# Patient Record
Sex: FEMALE | Race: WHITE | NOT HISPANIC OR LATINO
[De-identification: names, ages, dates, MRNs, and addresses within clinical notes are randomized per-mention and may not be internally consistent; named-entity substitution may affect disease eponyms.]

---

## 2020-01-29 ENCOUNTER — APPOINTMENT (OUTPATIENT)
Dept: INTERNAL MEDICINE | Facility: CLINIC | Age: 55
End: 2020-01-29
Payer: COMMERCIAL

## 2020-01-29 VITALS
RESPIRATION RATE: 16 BRPM | BODY MASS INDEX: 25.86 KG/M2 | WEIGHT: 137 LBS | DIASTOLIC BLOOD PRESSURE: 84 MMHG | HEART RATE: 83 BPM | HEIGHT: 61 IN | OXYGEN SATURATION: 96 % | SYSTOLIC BLOOD PRESSURE: 124 MMHG

## 2020-01-29 DIAGNOSIS — Z82.49 FAMILY HISTORY OF ISCHEMIC HEART DISEASE AND OTHER DISEASES OF THE CIRCULATORY SYSTEM: ICD-10-CM

## 2020-01-29 DIAGNOSIS — J11.1 INFLUENZA DUE TO UNIDENTIFIED INFLUENZA VIRUS WITH OTHER RESPIRATORY MANIFESTATIONS: ICD-10-CM

## 2020-01-29 DIAGNOSIS — Z83.438 FAMILY HISTORY OF OTHER DISORDER OF LIPOPROTEIN METABOLISM AND OTHER LIPIDEMIA: ICD-10-CM

## 2020-01-29 PROCEDURE — 99203 OFFICE O/P NEW LOW 30 MIN: CPT

## 2020-01-29 NOTE — PHYSICAL EXAM
[No Acute Distress] : no acute distress [Well Nourished] : well nourished [Normal Oropharynx] : the oropharynx was normal [Normal Outer Ear/Nose] : the outer ears and nose were normal in appearance [No JVD] : no jugular venous distention [No Lymphadenopathy] : no lymphadenopathy [No Respiratory Distress] : no respiratory distress  [No Accessory Muscle Use] : no accessory muscle use [Clear to Auscultation] : lungs were clear to auscultation bilaterally [Normal Rate] : normal rate  [Regular Rhythm] : with a regular rhythm [Normal S1, S2] : normal S1 and S2 [No Edema] : there was no peripheral edema [No Extremity Clubbing/Cyanosis] : no extremity clubbing/cyanosis [Soft] : abdomen soft [Non Tender] : non-tender [Non-distended] : non-distended

## 2020-01-29 NOTE — HISTORY OF PRESENT ILLNESS
[de-identified] : Pt comes to establish care  Pt is s/p flu in Jan  Had fliu shot in [facility]  Pt is exposed as a

## 2020-01-29 NOTE — REVIEW OF SYSTEMS
[Fever] : no fever [Chills] : no chills [Chest Pain] : no chest pain [Palpitations] : no palpitations [Lower Ext Edema] : no lower extremity edema [Wheezing] : no wheezing [Shortness Of Breath] : no shortness of breath [Cough] : no cough [Abdominal Pain] : no abdominal pain [Diarrhea] : diarrhea [Nausea] : no nausea [Dysuria] : no dysuria

## 2020-02-01 ENCOUNTER — NON-APPOINTMENT (OUTPATIENT)
Age: 55
End: 2020-02-01

## 2020-02-01 ENCOUNTER — APPOINTMENT (OUTPATIENT)
Dept: INTERNAL MEDICINE | Facility: CLINIC | Age: 55
End: 2020-02-01
Payer: COMMERCIAL

## 2020-02-01 VITALS
SYSTOLIC BLOOD PRESSURE: 116 MMHG | DIASTOLIC BLOOD PRESSURE: 80 MMHG | OXYGEN SATURATION: 97 % | HEART RATE: 97 BPM | BODY MASS INDEX: 25.86 KG/M2 | WEIGHT: 137 LBS | HEIGHT: 61 IN

## 2020-02-01 PROCEDURE — 99396 PREV VISIT EST AGE 40-64: CPT | Mod: 25

## 2020-02-01 PROCEDURE — 81003 URINALYSIS AUTO W/O SCOPE: CPT | Mod: QW

## 2020-02-01 PROCEDURE — 90471 IMMUNIZATION ADMIN: CPT

## 2020-02-01 PROCEDURE — 90715 TDAP VACCINE 7 YRS/> IM: CPT

## 2020-02-01 PROCEDURE — 36415 COLL VENOUS BLD VENIPUNCTURE: CPT

## 2020-02-01 PROCEDURE — 93000 ELECTROCARDIOGRAM COMPLETE: CPT

## 2020-02-01 NOTE — PHYSICAL EXAM
[Well Nourished] : well nourished [No Acute Distress] : no acute distress [PERRL] : pupils equal round and reactive to light [Normal Sclera/Conjunctiva] : normal sclera/conjunctiva [Normal Outer Ear/Nose] : the outer ears and nose were normal in appearance [EOMI] : extraocular movements intact [Normal Oropharynx] : the oropharynx was normal [No JVD] : no jugular venous distention [No Lymphadenopathy] : no lymphadenopathy [No Respiratory Distress] : no respiratory distress  [Supple] : supple [Thyroid Normal, No Nodules] : the thyroid was normal and there were no nodules present [Clear to Auscultation] : lungs were clear to auscultation bilaterally [Normal Rate] : normal rate  [No Accessory Muscle Use] : no accessory muscle use [Regular Rhythm] : with a regular rhythm [Normal S1, S2] : normal S1 and S2 [No Edema] : there was no peripheral edema [No Extremity Clubbing/Cyanosis] : no extremity clubbing/cyanosis [No Carotid Bruits] : no carotid bruits [Declined Breast Exam] : declined breast exam  [Soft] : abdomen soft [Non-distended] : non-distended [Non Tender] : non-tender [No Masses] : no abdominal mass palpated [Normal Posterior Cervical Nodes] : no posterior cervical lymphadenopathy [No HSM] : no HSM [Declined Rectal Exam] : declined rectal exam [No CVA Tenderness] : no CVA  tenderness [Normal Anterior Cervical Nodes] : no anterior cervical lymphadenopathy [No Spinal Tenderness] : no spinal tenderness [No Joint Swelling] : no joint swelling [No Rash] : no rash [Coordination Grossly Intact] : coordination grossly intact [Normal Gait] : normal gait [No Focal Deficits] : no focal deficits [Normal Affect] : the affect was normal [Normal Insight/Judgement] : insight and judgment were intact

## 2020-02-01 NOTE — REVIEW OF SYSTEMS
[Chills] : no chills [Fever] : no fever [Discharge] : no discharge [Vision Problems] : no vision problems [Earache] : no earache [Hoarseness] : no hoarseness [Chest Pain] : no chest pain [Hearing Loss] : no hearing loss [Leg Claudication] : no leg claudication [Palpitations] : no palpitations [Shortness Of Breath] : no shortness of breath [Lower Ext Edema] : no lower extremity edema [Cough] : no cough [Wheezing] : no wheezing [Nausea] : no nausea [Abdominal Pain] : no abdominal pain [Hematuria] : no hematuria [Dysuria] : no dysuria [Incontinence] : no incontinence [Itching] : no itching [Mole Changes] : no mole changes [Hair Changes] : no hair changes [Skin Rash] : no skin rash [Headache] : no headache [Dizziness] : no dizziness [Confusion] : no confusion [Memory Loss] : no memory loss [Fainting] : no fainting [Depression] : no depression [Unsteady Walking] : no ataxia [Anxiety] : no anxiety [Easy Bleeding] : no easy bleeding [[email or web address]] : 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

## 2020-02-04 LAB
25(OH)D3 SERPL-MCNC: 41.2 NG/ML
ALBUMIN SERPL ELPH-MCNC: 4.6 G/DL
ALP BLD-CCNC: 79 U/L
ALT SERPL-CCNC: 33 U/L
ANION GAP SERPL CALC-SCNC: 10 MMOL/L
AST SERPL-CCNC: 21 U/L
BASOPHILS # BLD AUTO: 0.03 K/UL
BASOPHILS NFR BLD AUTO: 0.6 %
BILIRUB SERPL-MCNC: 0.8 MG/DL
BILIRUB UR QL STRIP: NEGATIVE
BUN SERPL-MCNC: 17 MG/DL
CALCIUM SERPL-MCNC: 9.4 MG/DL
CHLORIDE SERPL-SCNC: 110 MMOL/L
CHOLEST SERPL-MCNC: 115 MG/DL
CHOLEST/HDLC SERPL: 1.8 RATIO
CLARITY UR: CLEAR
CO2 SERPL-SCNC: 22 MMOL/L
COLLECTION METHOD: NORMAL
CREAT SERPL-MCNC: 0.67 MG/DL
EOSINOPHIL # BLD AUTO: 0.4 K/UL
EOSINOPHIL NFR BLD AUTO: 7.4 %
ESTIMATED AVERAGE GLUCOSE: 114 MG/DL
GLUCOSE SERPL-MCNC: 109 MG/DL
GLUCOSE UR-MCNC: NEGATIVE
HBA1C MFR BLD HPLC: 5.6 %
HCG UR QL: 0.2 EU/DL
HCT VFR BLD CALC: 46.6 %
HDLC SERPL-MCNC: 63 MG/DL
HGB BLD-MCNC: 15.2 G/DL
HGB UR QL STRIP.AUTO: NEGATIVE
IMM GRANULOCYTES NFR BLD AUTO: 0.2 %
KETONES UR-MCNC: NEGATIVE
LDLC SERPL CALC-MCNC: 41 MG/DL
LEUKOCYTE ESTERASE UR QL STRIP: NEGATIVE
LYMPHOCYTES # BLD AUTO: 1.63 K/UL
LYMPHOCYTES NFR BLD AUTO: 30.3 %
MAN DIFF?: NORMAL
MCHC RBC-ENTMCNC: 29.6 PG
MCHC RBC-ENTMCNC: 32.6 GM/DL
MCV RBC AUTO: 90.8 FL
MONOCYTES # BLD AUTO: 0.34 K/UL
MONOCYTES NFR BLD AUTO: 6.3 %
NEUTROPHILS # BLD AUTO: 2.97 K/UL
NEUTROPHILS NFR BLD AUTO: 55.2 %
NITRITE UR QL STRIP: NEGATIVE
PH UR STRIP: 6
PLATELET # BLD AUTO: 176 K/UL
POTASSIUM SERPL-SCNC: 4.3 MMOL/L
PROT SERPL-MCNC: 6.9 G/DL
PROT UR STRIP-MCNC: NEGATIVE
RBC # BLD: 5.13 M/UL
RBC # FLD: 13.1 %
SODIUM SERPL-SCNC: 142 MMOL/L
SP GR UR STRIP: 1.02
TRIGL SERPL-MCNC: 55 MG/DL
TSH SERPL-ACNC: 2 UIU/ML
WBC # FLD AUTO: 5.38 K/UL

## 2020-12-23 PROBLEM — J11.1 INFLUENZA IN ADULT: Status: RESOLVED | Noted: 2020-01-29 | Resolved: 2020-12-23

## 2022-05-02 ENCOUNTER — LABORATORY RESULT (OUTPATIENT)
Age: 57
End: 2022-05-02

## 2022-05-02 ENCOUNTER — APPOINTMENT (OUTPATIENT)
Dept: INTERNAL MEDICINE | Facility: CLINIC | Age: 57
End: 2022-05-02
Payer: COMMERCIAL

## 2022-05-02 VITALS
WEIGHT: 131 LBS | RESPIRATION RATE: 16 BRPM | HEART RATE: 71 BPM | OXYGEN SATURATION: 99 % | TEMPERATURE: 98 F | HEIGHT: 61 IN | BODY MASS INDEX: 24.73 KG/M2

## 2022-05-02 DIAGNOSIS — R21 RASH AND OTHER NONSPECIFIC SKIN ERUPTION: ICD-10-CM

## 2022-05-02 PROCEDURE — 99213 OFFICE O/P EST LOW 20 MIN: CPT | Mod: 25

## 2022-05-02 PROCEDURE — 36415 COLL VENOUS BLD VENIPUNCTURE: CPT

## 2022-05-02 NOTE — REVIEW OF SYSTEMS
[Fever] : no fever [Chills] : no chills [Chest Pain] : no chest pain [Shortness Of Breath] : no shortness of breath [Wheezing] : no wheezing [Abdominal Pain] : no abdominal pain [Itching] : Itching [Skin Rash] : skin rash

## 2022-05-02 NOTE — HISTORY OF PRESENT ILLNESS
[de-identified] : Pt comes for repeat blood test and refer to rheum  Pt had rash and derm did WENDY pos 1:160    No swelling of any joints  no FH

## 2022-05-02 NOTE — PHYSICAL EXAM
[No Acute Distress] : no acute distress [Normal Sclera/Conjunctiva] : normal sclera/conjunctiva [Normal Outer Ear/Nose] : the outer ears and nose were normal in appearance [No JVD] : no jugular venous distention [No Respiratory Distress] : no respiratory distress  [No Joint Swelling] : no joint swelling [Grossly Normal Strength/Tone] : grossly normal strength/tone [de-identified] : dry skin rt thumb base

## 2022-05-03 LAB
CRP SERPL-MCNC: <3 MG/L
ERYTHROCYTE [SEDIMENTATION RATE] IN BLOOD BY WESTERGREN METHOD: 23 MM/HR
RHEUMATOID FACT SER QL: <10 IU/ML

## 2022-05-06 ENCOUNTER — NON-APPOINTMENT (OUTPATIENT)
Age: 57
End: 2022-05-06

## 2022-05-06 LAB
ANA PAT FLD IF-IMP: ABNORMAL
ANACR T: ABNORMAL

## 2023-09-25 ENCOUNTER — NON-APPOINTMENT (OUTPATIENT)
Age: 58
End: 2023-09-25

## 2023-09-25 ENCOUNTER — LABORATORY RESULT (OUTPATIENT)
Age: 58
End: 2023-09-25

## 2023-09-25 ENCOUNTER — APPOINTMENT (OUTPATIENT)
Dept: INTERNAL MEDICINE | Facility: CLINIC | Age: 58
End: 2023-09-25
Payer: COMMERCIAL

## 2023-09-25 VITALS — BODY MASS INDEX: 24.73 KG/M2 | OXYGEN SATURATION: 97 % | HEIGHT: 61 IN | WEIGHT: 131 LBS | HEART RATE: 74 BPM

## 2023-09-25 VITALS — SYSTOLIC BLOOD PRESSURE: 120 MMHG | DIASTOLIC BLOOD PRESSURE: 80 MMHG

## 2023-09-25 DIAGNOSIS — Z23 ENCOUNTER FOR IMMUNIZATION: ICD-10-CM

## 2023-09-25 DIAGNOSIS — Z00.00 ENCOUNTER FOR GENERAL ADULT MEDICAL EXAMINATION W/OUT ABNORMAL FINDINGS: ICD-10-CM

## 2023-09-25 DIAGNOSIS — R76.8 OTHER SPECIFIED ABNORMAL IMMUNOLOGICAL FINDINGS IN SERUM: ICD-10-CM

## 2023-09-25 DIAGNOSIS — R73.9 HYPERGLYCEMIA, UNSPECIFIED: ICD-10-CM

## 2023-09-25 PROCEDURE — 93000 ELECTROCARDIOGRAM COMPLETE: CPT

## 2023-09-25 PROCEDURE — 90686 IIV4 VACC NO PRSV 0.5 ML IM: CPT

## 2023-09-25 PROCEDURE — 36415 COLL VENOUS BLD VENIPUNCTURE: CPT

## 2023-09-25 PROCEDURE — 99396 PREV VISIT EST AGE 40-64: CPT | Mod: 25

## 2023-09-25 PROCEDURE — 90471 IMMUNIZATION ADMIN: CPT

## 2023-09-26 LAB
ALBUMIN SERPL ELPH-MCNC: 4.7 G/DL
ALP BLD-CCNC: 86 U/L
ALT SERPL-CCNC: 34 U/L
ANION GAP SERPL CALC-SCNC: 11 MMOL/L
APPEARANCE: CLEAR
AST SERPL-CCNC: 27 U/L
BACTERIA: NEGATIVE /HPF
BASOPHILS # BLD AUTO: 0.05 K/UL
BASOPHILS NFR BLD AUTO: 0.8 %
BILIRUB SERPL-MCNC: 0.8 MG/DL
BILIRUBIN URINE: NEGATIVE
BLOOD URINE: NEGATIVE
BUN SERPL-MCNC: 17 MG/DL
CALCIUM SERPL-MCNC: 9.9 MG/DL
CAST: 0 /LPF
CHLORIDE SERPL-SCNC: 106 MMOL/L
CHOLEST SERPL-MCNC: 124 MG/DL
CO2 SERPL-SCNC: 25 MMOL/L
COLOR: YELLOW
CREAT SERPL-MCNC: 0.75 MG/DL
CRP SERPL-MCNC: 4 MG/L
EGFR: 92 ML/MIN/1.73M2
EOSINOPHIL # BLD AUTO: 0.24 K/UL
EOSINOPHIL NFR BLD AUTO: 3.9 %
EPITHELIAL CELLS: 0 /HPF
ERYTHROCYTE [SEDIMENTATION RATE] IN BLOOD BY WESTERGREN METHOD: 20 MM/HR
ESTIMATED AVERAGE GLUCOSE: 117 MG/DL
GLUCOSE QUALITATIVE U: NEGATIVE MG/DL
GLUCOSE SERPL-MCNC: 104 MG/DL
HBA1C MFR BLD HPLC: 5.7 %
HCT VFR BLD CALC: 48.2 %
HDLC SERPL-MCNC: 60 MG/DL
HGB BLD-MCNC: 15.6 G/DL
IMM GRANULOCYTES NFR BLD AUTO: 0.3 %
KETONES URINE: NEGATIVE MG/DL
LDLC SERPL CALC-MCNC: 48 MG/DL
LEUKOCYTE ESTERASE URINE: NEGATIVE
LYMPHOCYTES # BLD AUTO: 1.76 K/UL
LYMPHOCYTES NFR BLD AUTO: 28.3 %
MAN DIFF?: NORMAL
MCHC RBC-ENTMCNC: 30.2 PG
MCHC RBC-ENTMCNC: 32.4 GM/DL
MCV RBC AUTO: 93.4 FL
MICROSCOPIC-UA: NORMAL
MONOCYTES # BLD AUTO: 0.48 K/UL
MONOCYTES NFR BLD AUTO: 7.7 %
NEUTROPHILS # BLD AUTO: 3.68 K/UL
NEUTROPHILS NFR BLD AUTO: 59 %
NITRITE URINE: NEGATIVE
NONHDLC SERPL-MCNC: 63 MG/DL
PH URINE: 5.5
PLATELET # BLD AUTO: 181 K/UL
POTASSIUM SERPL-SCNC: 4.2 MMOL/L
PROT SERPL-MCNC: 7.3 G/DL
PROTEIN URINE: NEGATIVE MG/DL
RBC # BLD: 5.16 M/UL
RBC # FLD: 14 %
RED BLOOD CELLS URINE: 0 /HPF
SODIUM SERPL-SCNC: 142 MMOL/L
SPECIFIC GRAVITY URINE: 1.02
TRIGL SERPL-MCNC: 78 MG/DL
TSH SERPL-ACNC: 1.97 UIU/ML
UROBILINOGEN URINE: 0.2 MG/DL
WBC # FLD AUTO: 6.23 K/UL
WHITE BLOOD CELLS URINE: 0 /HPF

## 2023-09-27 LAB
ANA PAT FLD IF-IMP: ABNORMAL
ANACR T: ABNORMAL

## 2024-09-27 ENCOUNTER — NON-APPOINTMENT (OUTPATIENT)
Age: 59
End: 2024-09-27

## 2024-09-27 ENCOUNTER — APPOINTMENT (OUTPATIENT)
Dept: INTERNAL MEDICINE | Facility: CLINIC | Age: 59
End: 2024-09-27
Payer: COMMERCIAL

## 2024-09-27 VITALS — HEART RATE: 82 BPM | OXYGEN SATURATION: 98 %

## 2024-09-27 VITALS — WEIGHT: 140.5 LBS | HEIGHT: 61 IN | BODY MASS INDEX: 26.53 KG/M2

## 2024-09-27 VITALS — DIASTOLIC BLOOD PRESSURE: 78 MMHG | SYSTOLIC BLOOD PRESSURE: 122 MMHG

## 2024-09-27 DIAGNOSIS — E55.9 VITAMIN D DEFICIENCY, UNSPECIFIED: ICD-10-CM

## 2024-09-27 DIAGNOSIS — R73.9 HYPERGLYCEMIA, UNSPECIFIED: ICD-10-CM

## 2024-09-27 DIAGNOSIS — Z00.00 ENCOUNTER FOR GENERAL ADULT MEDICAL EXAMINATION W/OUT ABNORMAL FINDINGS: ICD-10-CM

## 2024-09-27 DIAGNOSIS — M81.0 AGE-RELATED OSTEOPOROSIS W/OUT CURRENT PATHOLOGICAL FRACTURE: ICD-10-CM

## 2024-09-27 DIAGNOSIS — Z23 ENCOUNTER FOR IMMUNIZATION: ICD-10-CM

## 2024-09-27 PROCEDURE — 90656 IIV3 VACC NO PRSV 0.5 ML IM: CPT

## 2024-09-27 PROCEDURE — 93000 ELECTROCARDIOGRAM COMPLETE: CPT

## 2024-09-27 PROCEDURE — 99396 PREV VISIT EST AGE 40-64: CPT | Mod: 25

## 2024-09-27 PROCEDURE — 90471 IMMUNIZATION ADMIN: CPT

## 2024-09-27 RX ORDER — ALENDRONATE SODIUM 70 MG/1
70 TABLET ORAL
Refills: 0 | Status: ACTIVE | COMMUNITY
Start: 2024-09-27

## 2024-09-27 NOTE — PHYSICAL EXAM
[No Acute Distress] : no acute distress [Normal Sclera/Conjunctiva] : normal sclera/conjunctiva [PERRL] : pupils equal round and reactive to light [Normal Outer Ear/Nose] : the outer ears and nose were normal in appearance [Normal Oropharynx] : the oropharynx was normal [No JVD] : no jugular venous distention [No Lymphadenopathy] : no lymphadenopathy [No Respiratory Distress] : no respiratory distress  [No Accessory Muscle Use] : no accessory muscle use [Clear to Auscultation] : lungs were clear to auscultation bilaterally [Normal Rate] : normal rate  [Regular Rhythm] : with a regular rhythm [No Carotid Bruits] : no carotid bruits [No Edema] : there was no peripheral edema [No Extremity Clubbing/Cyanosis] : no extremity clubbing/cyanosis [Declined Breast Exam] : declined breast exam  [Soft] : abdomen soft [Declined Rectal Exam] : declined rectal exam [Normal Anterior Cervical Nodes] : no anterior cervical lymphadenopathy [No Spinal Tenderness] : no spinal tenderness [Grossly Normal Strength/Tone] : grossly normal strength/tone [No Rash] : no rash [No Focal Deficits] : no focal deficits [Normal Affect] : the affect was normal [Normal Insight/Judgement] : insight and judgment were intact

## 2024-09-27 NOTE — HISTORY OF PRESENT ILLNESS
[de-identified] : Pt comes for adult well visit  Pt interested in seeing nutritionist for weight issue.  Pt exercises ant gym and does wieght bearing exercises.  Pt has hx of pos WENDY  Pt needs flu vaccine  Discussed COVID vaccine.  Going for mammo  Sees Dr [facility]

## 2024-09-27 NOTE — HEALTH RISK ASSESSMENT
[Yes] : Yes [Monthly or less (1 pt)] : Monthly or less (1 point) [1 or 2 (0 pts)] : 1 or 2 (0 points) [Never (0 pts)] : Never (0 points) [No] : In the past 12 months have you used drugs other than those required for medical reasons? No [No falls in past year] : Patient reported no falls in the past year [0] : 2) Feeling down, depressed, or hopeless: Not at all (0) [PHQ-2 Negative - No further assessment needed] : PHQ-2 Negative - No further assessment needed [Never] : Never [Audit-CScore] : 1 [[id number]] : 0

## 2024-09-27 NOTE — REVIEW OF SYSTEMS
[Fever] : no fever [Chills] : no chills [Discharge] : no discharge [Vision Problems] : no vision problems [Earache] : no earache [Sore Throat] : no sore throat [Chest Pain] : no chest pain [Palpitations] : no palpitations [Lower Ext Edema] : no lower extremity edema [Shortness Of Breath] : no shortness of breath [Wheezing] : no wheezing [Cough] : no cough [Abdominal Pain] : no abdominal pain [Dysuria] : no dysuria [Hematuria] : no hematuria [Joint Pain] : no joint pain [Joint Stiffness] : no joint stiffness [Skin Rash] : no skin rash [Headache] : no headache [Dizziness] : no dizziness [Confusion] : no confusion [Memory Loss] : no memory loss [Unsteady Walking] : no ataxia [Depression] : no depression [Swollen Glands] : no swollen glands

## 2024-09-30 LAB
25(OH)D3 SERPL-MCNC: 63.3 NG/ML
ALBUMIN SERPL ELPH-MCNC: 4.5 G/DL
ALP BLD-CCNC: 92 U/L
ALT SERPL-CCNC: 31 U/L
ANION GAP SERPL CALC-SCNC: 12 MMOL/L
APPEARANCE: CLEAR
AST SERPL-CCNC: 23 U/L
BACTERIA: NEGATIVE /HPF
BASOPHILS # BLD AUTO: 0.03 K/UL
BASOPHILS NFR BLD AUTO: 0.4 %
BILIRUB SERPL-MCNC: 0.5 MG/DL
BILIRUBIN URINE: NEGATIVE
BLOOD URINE: NEGATIVE
BUN SERPL-MCNC: 24 MG/DL
CALCIUM SERPL-MCNC: 9.9 MG/DL
CAST: 0 /LPF
CHLORIDE SERPL-SCNC: 109 MMOL/L
CHOLEST SERPL-MCNC: 115 MG/DL
CO2 SERPL-SCNC: 22 MMOL/L
COLOR: YELLOW
CREAT SERPL-MCNC: 0.69 MG/DL
EGFR: 100 ML/MIN/1.73M2
EOSINOPHIL # BLD AUTO: 0.22 K/UL
EOSINOPHIL NFR BLD AUTO: 3.1 %
EPITHELIAL CELLS: 1 /HPF
ESTIMATED AVERAGE GLUCOSE: 114 MG/DL
GLUCOSE QUALITATIVE U: NEGATIVE MG/DL
GLUCOSE SERPL-MCNC: 109 MG/DL
HBA1C MFR BLD HPLC: 5.6 %
HCT VFR BLD CALC: 47.1 %
HDLC SERPL-MCNC: 52 MG/DL
HGB BLD-MCNC: 15.2 G/DL
IMM GRANULOCYTES NFR BLD AUTO: 0.1 %
KETONES URINE: NEGATIVE MG/DL
LDLC SERPL CALC-MCNC: 47 MG/DL
LEUKOCYTE ESTERASE URINE: NEGATIVE
LYMPHOCYTES # BLD AUTO: 1.68 K/UL
LYMPHOCYTES NFR BLD AUTO: 23.8 %
MAN DIFF?: NORMAL
MCHC RBC-ENTMCNC: 29.9 PG
MCHC RBC-ENTMCNC: 32.3 GM/DL
MCV RBC AUTO: 92.7 FL
MICROSCOPIC-UA: NORMAL
MONOCYTES # BLD AUTO: 0.38 K/UL
MONOCYTES NFR BLD AUTO: 5.4 %
NEUTROPHILS # BLD AUTO: 4.75 K/UL
NEUTROPHILS NFR BLD AUTO: 67.2 %
NITRITE URINE: NEGATIVE
NONHDLC SERPL-MCNC: 64 MG/DL
PH URINE: 5.5
PLATELET # BLD AUTO: 190 K/UL
POTASSIUM SERPL-SCNC: 4.3 MMOL/L
PROT SERPL-MCNC: 7.3 G/DL
PROTEIN URINE: NEGATIVE MG/DL
RBC # BLD: 5.08 M/UL
RBC # FLD: 13.6 %
RED BLOOD CELLS URINE: 1 /HPF
SODIUM SERPL-SCNC: 143 MMOL/L
SPECIFIC GRAVITY URINE: 1.02
TRIGL SERPL-MCNC: 85 MG/DL
TSH SERPL-ACNC: 1.42 UIU/ML
UROBILINOGEN URINE: 0.2 MG/DL
WBC # FLD AUTO: 7.07 K/UL
WHITE BLOOD CELLS URINE: 0 /HPF